# Patient Record
Sex: MALE | Race: WHITE | URBAN - METROPOLITAN AREA
[De-identification: names, ages, dates, MRNs, and addresses within clinical notes are randomized per-mention and may not be internally consistent; named-entity substitution may affect disease eponyms.]

---

## 2017-08-10 ENCOUNTER — OFFICE VISIT (OUTPATIENT)
Dept: FAMILY MEDICINE | Facility: CLINIC | Age: 22
End: 2017-08-10
Payer: COMMERCIAL

## 2017-08-10 VITALS
WEIGHT: 156 LBS | SYSTOLIC BLOOD PRESSURE: 105 MMHG | HEART RATE: 46 BPM | DIASTOLIC BLOOD PRESSURE: 63 MMHG | OXYGEN SATURATION: 98 % | BODY MASS INDEX: 21.84 KG/M2 | HEIGHT: 71 IN | TEMPERATURE: 97.5 F

## 2017-08-10 DIAGNOSIS — G56.03 BILATERAL CARPAL TUNNEL SYNDROME: Primary | ICD-10-CM

## 2017-08-10 PROCEDURE — 99213 OFFICE O/P EST LOW 20 MIN: CPT | Performed by: FAMILY MEDICINE

## 2017-08-10 RX ORDER — CYCLOSPORINE 0.5 MG/ML
1 EMULSION OPHTHALMIC 2 TIMES DAILY
COMMUNITY

## 2017-08-10 NOTE — PROGRESS NOTES
"  HPI:    Xu is a 22 year old male here to discuss:    Bilateral carpal tunnel syndrome - mostly the right. The left is actually not bad. Present since 2016. The symptoms are intermittent pain from the wrist to the 1st to 3rd fingers and also radiates to the forearms. There is tingling at times he has to shake his hands to feel better. He does a lot of typing and mousing at work. Denies any injury.     ROS:    No fevers. No joint swelling. No rashes.     Exam:    /63  Pulse (!) 46  Temp 97.5  F (36.4  C) (Oral)  Ht 5' 11\" (1.803 m)  Wt 156 lb (70.8 kg)  SpO2 98%  BMI 21.76 kg/m2    Gen: Healthy appearing male in no acute distress  MS: both hands and fingers without tenderness or swelling.  Neuro: no weakness on the , wrists and fingers. Tinel and Phalen negative.      Assessment and Plan - Decision Making    1. Bilateral carpal tunnel syndrome  See below.  - ORTHOPEDICS ADULT REFERRAL      Written instructions given as follows:    Patient Instructions   1. Wear the wrist brace as much as you can. Try to keep the wrists in a neutral position.    2. If the problem gets worse, please see orthopedic doctor - [phone].    3. Come in for a physical with fasting labs.          "

## 2017-08-10 NOTE — NURSING NOTE
"Chief Complaint   Patient presents with     Musculoskeletal Problem     Both but R side worst forearm, wrist and hand pain per pt x 1 year on and off no known injury       Initial /63  Pulse (!) 46  Temp 97.5  F (36.4  C) (Oral)  Ht 5' 11\" (1.803 m)  Wt 156 lb (70.8 kg)  SpO2 98%  BMI 21.76 kg/m2 Estimated body mass index is 21.76 kg/(m^2) as calculated from the following:    Height as of this encounter: 5' 11\" (1.803 m).    Weight as of this encounter: 156 lb (70.8 kg).  Medication Reconciliation: complete      Fidelina Kitchen MA    "

## 2017-08-10 NOTE — MR AVS SNAPSHOT
After Visit Summary   8/10/2017    Xu Pittman    MRN: [id number]           Patient Information     Date Of Birth          1995        Visit Information        Provider Department      8/10/2017 10:30 AM Madhu Martines MD [facility]        Today's Diagnoses     Bilateral carpal tunnel syndrome    -  1      Care Instructions    1. Wear the wrist brace as much as you can. Try to keep the wrists in a neutral position.    2. If the problem gets worse, please see orthopedic doctor - [phone].    3. Come in for a physical with fasting labs.          Follow-ups after your visit        Additional Services     ORTHOPEDICS ADULT REFERRAL       Your provider has referred you to: [facility]: [facility] [phone]   [email or web address]    Please be aware that coverage of these services is subject to the terms and limitations of your health insurance plan.  Call member services at your health plan with any benefit or coverage questions.      Please bring the following to your appointment:    >>   Any x-rays, CTs or MRIs which have been performed.  Contact the facility where they were done to arrange for  prior to your scheduled appointment.  Any new CT, MRI or other procedures ordered by your specialist must be performed at a [place] facility or coordinated by your clinic's referral office.    >>   List of current medications   >>   This referral request   >>   Any documents/labs given to you for this referral                  Who to contact     If you have questions or need follow up information about today's clinic visit or your schedule please contact [facility] directly at [phone].  Normal or non-critical lab and imaging results will be communicated to you by MyChart, letter or phone within 4 business days after the clinic has received the results. If you do not hear from us within 7 days, please contact the clinic  "through [employer] or phone. If you have a critical or abnormal lab result, we will notify you by phone as soon as possible.  Submit refill requests through Spreadknowledge or call your pharmacy and they will forward the refill request to us. Please allow 3 business days for your refill to be completed.          Additional Information About Your Visit        NeXeptionhart Information     Spreadknowledge gives you secure access to your electronic health record. If you see a primary care provider, you can also send messages to your care team and make appointments. If you have questions, please call your primary care clinic.  If you do not have a primary care provider, please call [phone] and they will assist you.        Care EveryWhere ID     This is your Care EveryWhere ID. This could be used by other organizations to access your [place] medical records  [id number]        Your Vitals Were     Pulse Temperature Height Pulse Oximetry BMI (Body Mass Index)       46 97.5  F (36.4  C) (Oral) 5' 11\" (1.803 m) 98% 21.76 kg/m2        Blood Pressure from Last 3 Encounters:   08/10/17 105/63   06/04/14 108/68   01/14/14 119/64    Weight from Last 3 Encounters:   08/10/17 156 lb (70.8 kg)   06/04/14 154 lb (69.9 kg)   01/14/14 149 lb (67.6 kg)              We Performed the Following     ORTHOPEDICS ADULT REFERRAL        Primary Care Provider    Md Other Clinic                Equal Access to Services     [facility]: Hadii aad ku wilfredoo Sojames, waaxda luqadaha, qaybta kaalmada kalina, rosalind davis . So [facility] [phone].    ATENCIÓN: Si habla español, tiene a ayala disposición servicios gratuitos de asistencia lingüística. Llame al [phone].    We comply with applicable federal civil rights laws and [place] laws. We do not discriminate on the basis of race, color, national origin, age, disability sex, sexual orientation or gender identity.            Thank you!     Thank you for choosing [facility] " [place]  for your care. Our goal is always to provide you with excellent care. Hearing back from our patients is one way we can continue to improve our services. Please take a few minutes to complete the written survey that you may receive in the mail after your visit with us. Thank you!             Your Updated Medication List - Protect others around you: Learn how to safely use, store and throw away your medicines at www.disposemymeds.org.          This list is accurate as of: 8/10/17 10:55 AM.  Always use your most recent med list.                   Brand Name Dispense Instructions for use Diagnosis    OMEGA-3 FISH OIL PO      Take 2 g by mouth daily        RESTASIS 0.05 % ophthalmic emulsion   Generic drug:  cycloSPORINE      1 drop 2 times daily

## 2017-08-10 NOTE — PATIENT INSTRUCTIONS
1. Wear the wrist brace as much as you can. Try to keep the wrists in a neutral position.    2. If the problem gets worse, please see orthopedic doctor - [phone].    3. Come in for a physical with fasting labs.

## 2018-03-14 NOTE — PROGRESS NOTES
HPI:    Xu is a 22 year old male, whom I have not seen in a while. He is here to discuss:    Anxiety - present for many years. Symptoms are worry about many things including health. He worries about what people are thinking. The main issue lately is that he feels people are talking about his poor school performance. He says he does well with his friends with whom he grew up. He has negative thoughts. These feelings are quite distressing to him. He denies symptoms of depression. No SI or HI.  Evaluation and treatment:    I am referring him to Yeny Markham, behavioral specialist.   I asked him to take Prozac 20 mg qd - side effects discussed.    Previous headaches - his headaches have resolved.  Evaluation and treatment:    CT of head was normal on 8/28/13.     Blepharitis - follows with eye doctor and is on Doxy but stopped it recently to see if the headaches were related.     Pityriasis versicolor - discussed previously.       Bilateral carpal tunnel syndrome - Present since 2016.   Evaluation and treatment:   He uses brace as needed.    He is asked to report worsening symptoms.    Preventive -     Immunization History   Administered Date(s) Administered     HepB 1995, 1993, 05/05/1994     Influenza Vaccine IM 3yrs+ 4 Valent IIV4 11/10/2013     MMR 10/25/1994, 07/26/1999     Mantoux Tuberculin Skin Test 08/14/2012     Meningococcal (Menactra ) 08/14/2012     Meningococcal (Menomune ) 08/06/2008     TD (ADULT, 7+) 04/14/2006     TDAP Vaccine (Adacel) 08/14/2012     Varicella 11/08/1996, 08/06/2008     Non fasting BMP and lipids ordered.    ROS:   Const: No fevers, weight changes or night sweats recently.   ENT: No runny nose, sore throat or ear pain.   Resp: No cough or shortness of breath.   CV: No chest pain, dizziness or cardiac palpitations.   GI: No nausea, vomiting, diarrhea or constipation. Denies blood in stools or black stools.   : No dysuria, frequency or hematuria.     SH:   Non  "smoker. Going to school. Caffeine 2 per day.       Exam:    /76 (Cuff Size: Adult Regular)  Pulse 69  Temp 97.1  F (36.2  C) (Oral)  Resp 16  Ht 5' 11\" (1.803 m)  Wt 157 lb (71.2 kg)  SpO2 98%  BMI 21.9 kg/m2    Gen: Healthy appearing male in no acute distress  Psych: Affect is normal. Speech is fluent. Thought logical. Insight and judgement seem to be intact. Denies SI or HI.      Assessment and Plan - Decision Making    1. Generalized anxiety disorder  Per HPI  - FLUoxetine (PROZAC) 20 MG capsule; Take 1 capsule (20 mg) by mouth daily  Dispense: 30 capsule; Refill: 0    2. Screening for diabetes mellitus  Per HPI  - Basic metabolic panel    3. Screening cholesterol level  Per [facility]  - Lipid panel reflex to direct LDL Non-fasting      Written instructions given as follows:    Patient Instructions   1. Take Prozac 20 mg daily. Possible side effects include sexual dysfunction and suicide ideation. You are to stop the medicine and report these right away if they occur. Remember that it might take 3-6 weeks to start being effective.    2. Make an appointment with Yeny Markham, behavioral specialist, [phone].    3. See you in 3 weeks for follow-up.          "

## 2018-03-16 ENCOUNTER — OFFICE VISIT (OUTPATIENT)
Dept: FAMILY MEDICINE | Facility: CLINIC | Age: 23
End: 2018-03-16
Payer: COMMERCIAL

## 2018-03-16 VITALS
RESPIRATION RATE: 16 BRPM | BODY MASS INDEX: 21.98 KG/M2 | TEMPERATURE: 97.1 F | OXYGEN SATURATION: 98 % | HEART RATE: 69 BPM | DIASTOLIC BLOOD PRESSURE: 76 MMHG | SYSTOLIC BLOOD PRESSURE: 122 MMHG | WEIGHT: 157 LBS | HEIGHT: 71 IN

## 2018-03-16 DIAGNOSIS — Z13.220 SCREENING CHOLESTEROL LEVEL: ICD-10-CM

## 2018-03-16 DIAGNOSIS — F41.1 GENERALIZED ANXIETY DISORDER: Primary | ICD-10-CM

## 2018-03-16 DIAGNOSIS — Z13.1 SCREENING FOR DIABETES MELLITUS: ICD-10-CM

## 2018-03-16 LAB
ANION GAP SERPL CALCULATED.3IONS-SCNC: 8 MMOL/L (ref 3–14)
BUN SERPL-MCNC: 19 MG/DL (ref 7–30)
CALCIUM SERPL-MCNC: 9.1 MG/DL (ref 8.5–10.1)
CHLORIDE SERPL-SCNC: 103 MMOL/L (ref 94–109)
CHOLEST SERPL-MCNC: 149 MG/DL
CO2 SERPL-SCNC: 27 MMOL/L (ref 20–32)
CREAT SERPL-MCNC: 1.06 MG/DL (ref 0.66–1.25)
GFR SERPL CREATININE-BSD FRML MDRD: 85 ML/MIN/1.7M2
GLUCOSE SERPL-MCNC: 92 MG/DL (ref 70–99)
HDLC SERPL-MCNC: 47 MG/DL
LDLC SERPL CALC-MCNC: 85 MG/DL
NONHDLC SERPL-MCNC: 102 MG/DL
POTASSIUM SERPL-SCNC: 4 MMOL/L (ref 3.4–5.3)
SODIUM SERPL-SCNC: 138 MMOL/L (ref 133–144)
TRIGL SERPL-MCNC: 85 MG/DL

## 2018-03-16 PROCEDURE — 80048 BASIC METABOLIC PNL TOTAL CA: CPT | Performed by: FAMILY MEDICINE

## 2018-03-16 PROCEDURE — 80061 LIPID PANEL: CPT | Performed by: FAMILY MEDICINE

## 2018-03-16 PROCEDURE — 99214 OFFICE O/P EST MOD 30 MIN: CPT | Performed by: FAMILY MEDICINE

## 2018-03-16 PROCEDURE — 36415 COLL VENOUS BLD VENIPUNCTURE: CPT | Performed by: FAMILY MEDICINE

## 2018-03-16 RX ORDER — DOXYCYCLINE 100 MG/1
TABLET ORAL
COMMUNITY
Start: 2018-01-05

## 2018-03-16 ASSESSMENT — ANXIETY QUESTIONNAIRES
2. NOT BEING ABLE TO STOP OR CONTROL WORRYING: NEARLY EVERY DAY
GAD7 TOTAL SCORE: 14
IF YOU CHECKED OFF ANY PROBLEMS ON THIS QUESTIONNAIRE, HOW DIFFICULT HAVE THESE PROBLEMS MADE IT FOR YOU TO DO YOUR WORK, TAKE CARE OF THINGS AT HOME, OR GET ALONG WITH OTHER PEOPLE: NOT DIFFICULT AT ALL
3. WORRYING TOO MUCH ABOUT DIFFERENT THINGS: NEARLY EVERY DAY
1. FEELING NERVOUS, ANXIOUS, OR ON EDGE: MORE THAN HALF THE DAYS
6. BECOMING EASILY ANNOYED OR IRRITABLE: MORE THAN HALF THE DAYS
7. FEELING AFRAID AS IF SOMETHING AWFUL MIGHT HAPPEN: SEVERAL DAYS
5. BEING SO RESTLESS THAT IT IS HARD TO SIT STILL: SEVERAL DAYS

## 2018-03-16 ASSESSMENT — PATIENT HEALTH QUESTIONNAIRE - PHQ9: 5. POOR APPETITE OR OVEREATING: MORE THAN HALF THE DAYS

## 2018-03-16 NOTE — NURSING NOTE
"Chief Complaint   Patient presents with     Anxiety       Initial /76 (Cuff Size: Adult Regular)  Pulse 69  Temp 97.1  F (36.2  C) (Oral)  Resp 16  Ht 5' 11\" (1.803 m)  Wt 157 lb (71.2 kg)  SpO2 98%  BMI 21.9 kg/m2 Estimated body mass index is 21.9 kg/(m^2) as calculated from the following:    Height as of this encounter: 5' 11\" (1.803 m).    Weight as of this encounter: 157 lb (71.2 kg).      Karen Devi LPN    "

## 2018-03-16 NOTE — MR AVS SNAPSHOT
After Visit Summary   3/16/2018    Xu Pittman    MRN: [id number]           Patient Information     Date Of Birth          1995        Visit Information        Provider Department      3/16/2018 11:50 AM Madhu Martines MD [facility]        Today's Diagnoses     Generalized anxiety disorder    -  1    Screening for diabetes mellitus        Screening cholesterol level          Care Instructions    1. Take Prozac 20 mg daily. Possible side effects include sexual dysfunction and suicide ideation. You are to stop the medicine and report these right away if they occur. Remember that it might take 3-6 weeks to start being effective.    2. Make an appointment with Yeny Markham, behavioral specialist, [phone].    3. See you in 3 weeks for follow-up.              Follow-ups after your visit        Who to contact     If you have questions or need follow up information about today's clinic visit or your schedule please contact [facility] directly at [phone].  Normal or non-critical lab and imaging results will be communicated to you by Rutanethart, letter or phone within 4 business days after the clinic has received the results. If you do not hear from us within 7 days, please contact the clinic through Yugmat or phone. If you have a critical or abnormal lab result, we will notify you by phone as soon as possible.  Submit refill requests through [employer] or call your pharmacy and they will forward the refill request to us. Please allow 3 business days for your refill to be completed.          Additional Information About Your Visit        Rutanethart Information     [employer] gives you secure access to your electronic health record. If you see a primary care provider, you can also send messages to your care team and make appointments. If you have questions, please call your primary care clinic.  If you do not have a primary care provider, please call [phone] and  "they will assist you.        Care EveryWhere ID     This is your Care EveryWhere ID. This could be used by other organizations to access your [place] medical records  [id number]        Your Vitals Were     Pulse Temperature Respirations Height Pulse Oximetry BMI (Body Mass Index)    69 97.1  F (36.2  C) (Oral) 16 5' 11\" (1.803 m) 98% 21.9 kg/m2       Blood Pressure from Last 3 Encounters:   03/16/18 122/76   08/10/17 105/63   06/04/14 108/68    Weight from Last 3 Encounters:   03/16/18 157 lb (71.2 kg)   08/10/17 156 lb (70.8 kg)   06/04/14 154 lb (69.9 kg)              We Performed the Following     Basic metabolic panel     Lipid panel reflex to direct LDL Fasting          Today's Medication Changes          These changes are accurate as of 3/16/18 12:31 PM.  If you have any questions, ask your nurse or doctor.               Start taking these medicines.        Dose/Directions    FLUoxetine 20 MG capsule   Commonly known as:  PROzac   Used for:  Generalized anxiety disorder   Started by:  Madhu Martines MD        Dose:  20 mg   Take 1 capsule (20 mg) by mouth daily   Quantity:  30 capsule   Refills:  0            Where to get your medicines      These medications were sent to [facility] [address], [facility] [address]  [address] [place], [place] [address]     Phone:  [phone]     FLUoxetine 20 MG capsule                Primary Care Provider Office Phone # Fax #    [facility] [phone] [address] [facility] [address]        Equal Access to Services     [facility]: Hadii aad ku hadasho Soomaali, waaxda luqadaha, qaybta kaalmada adeegyada, rosalind bal. So [facility] [phone].    ATENCIÓN: Si habla español, tiene a ayala disposición servicios gratuitos de asistencia lingüística. Llame al [phone].    We comply with applicable federal civil rights laws and [place] laws. We do not discriminate on " the basis of race, color, national origin, age, disability, sex, sexual orientation, or gender identity.            Thank you!     Thank you for choosing [facility]  [facility] your [facility]. Our goal is always to provide you with excellent care. Hearing back from our patients is one way we can continue to improve our services. Please take a few minutes to complete the written survey that you may receive in the mail after your visit with us. Thank you!             Your Updated Medication List - Protect others around you: Learn how to safely use, store and throw away your medicines at www.disposemymeds.org.          This list is accurate as of 3/16/18 12:31 PM.  Always use your most recent med list.                   Brand Name Dispense Instructions for use Diagnosis    doxycycline Monohydrate 100 MG Tabs           FLUoxetine 20 MG capsule    PROzac    30 capsule    Take 1 capsule (20 mg) by mouth daily    Generalized anxiety disorder       OMEGA-3 FISH OIL PO      Take 2 g by mouth daily        RESTASIS 0.05 % ophthalmic emulsion   Generic drug:  cycloSPORINE      1 drop 2 times daily

## 2018-03-16 NOTE — PATIENT INSTRUCTIONS
1. Take Prozac 20 mg daily. Possible side effects include sexual dysfunction and suicide ideation. You are to stop the medicine and report these right away if they occur. Remember that it might take 3-6 weeks to start being effective.    2. Make an appointment with Yeny Markham, behavioral specialist, [phone].    3. See you in 3 weeks for follow-up.

## 2018-03-17 ASSESSMENT — PATIENT HEALTH QUESTIONNAIRE - PHQ9: SUM OF ALL RESPONSES TO PHQ QUESTIONS 1-9: 18

## 2018-03-17 ASSESSMENT — ANXIETY QUESTIONNAIRES: GAD7 TOTAL SCORE: 14

## 2018-03-21 ENCOUNTER — TELEPHONE (OUTPATIENT)
Dept: FAMILY MEDICINE | Facility: CLINIC | Age: 23
End: 2018-03-21

## 2018-03-21 DIAGNOSIS — F41.1 GENERALIZED ANXIETY DISORDER: Primary | ICD-10-CM

## 2018-03-21 NOTE — TELEPHONE ENCOUNTER
Patient states Fluoxetine makes him feel like he has 5 cup of coffee and felling unconvertible. Please advise. Ok to leave a message.

## 2018-03-21 NOTE — TELEPHONE ENCOUNTER
Pt started prozac 20 mg daily on Friday.  He takes it in the morning.  Pt has been feeling strange on it.  He has hx of panic attacks but has not had one in a long time.  He has now had one yesterday and today about 5-7 pm.  He is feeling like he had lots of coffee, he is pacing and restless.  Discussed with pt that with anxiety medications there are often side effects when you start them that go away after you have been on the medication for several weeks.  Advised we try to classify them as tolerable or not tolerable.  Pt states if they continue like this for much longer or worsen they will not be tolerable.  Advised Dr. Madhu Martines will be back in clinic about 10 am tomorrow and I will review your message with him and you back.  Advised ok to wait on morning dose until I call back.  Confirmed number to call in morning.    Reviewed 1-2-3 breathing and progressive muscle relaxation techniques with pt to use during anxiety or panic attacks.  Pt verbalized understanding.  Karley Justice RN

## 2018-03-22 RX ORDER — CITALOPRAM HYDROBROMIDE 10 MG/1
10 TABLET ORAL DAILY
Qty: 30 TABLET | Refills: 0 | Status: SHIPPED | OUTPATIENT
Start: 2018-03-22 | End: 2018-05-15

## 2018-03-22 NOTE — TELEPHONE ENCOUNTER
Ok.    Sometimes Prozac can do that.    Let's stop that and try Celexa 10 mg at bedtime.    Send update in a few days.    Madhu Martines M.D.

## 2018-03-22 NOTE — TELEPHONE ENCOUNTER
Pt notified of provider message as written.  Pt verbalized good understanding.  Karley Justice RN

## 2018-03-26 NOTE — TELEPHONE ENCOUNTER
Left message on answering machine for patient to call back to [phone] and give update on new med.  Karley Justice RN

## 2018-03-27 NOTE — TELEPHONE ENCOUNTER
Left message on answering machine for patient to call back. [phone]  Donna ALICEAN, RN, CPN

## 2018-05-14 NOTE — PROGRESS NOTES
HPI:    Xu is a 22 year old male, whom I have not seen in a while. He is here to discuss:    Anxiety - present for many years. Symptoms are worry about many things including health. He worries about what people are thinking. The main issue lately is that he feels people are talking about his poor school performance. He says he does well with his friends with whom he grew up. He has negative thoughts. These feelings are quite distressing to him. He denies symptoms of depression. No SI or HI.  Evaluation and treatment:    I had referred him to Yeny Markham, behavioral specialist but he did not set it up - encouraged to do so.   Prozac - stopped due to increased anxiety.   I had rx'd Celexa 20 mg but he did not start it - I encouraged him to do so.   I rx'd Propranolol as needed.    Previous headaches - his headaches have resolved.  Evaluation and treatment:    CT of head was normal on 8/28/13.     Blepharitis - follows with eye doctor and is on Doxy but stopped it recently to see if the headaches were related.     Pityriasis versicolor - discussed previously.     Bilateral carpal tunnel syndrome - Present since 2016.   Evaluation and treatment:   He uses brace as needed.    He is asked to report worsening symptoms.    Preventive - we gave him 1st HPV today.    Immunization History   Administered Date(s) Administered     HPV9 05/15/2018     HepB 1993, 1993, 05/05/1994     Influenza Vaccine IM 3yrs+ 4 Valent IIV4 11/10/2013     MMR 10/25/1994, 07/26/1999     Mantoux Tuberculin Skin Test 08/14/2012     Meningococcal (Menactra ) 08/14/2012     Meningococcal (Menomune ) 08/06/2008     TD (ADULT, 7+) 04/14/2006     TDAP Vaccine (Adacel) 08/14/2012     Varicella 11/08/1996, 08/06/2008     STD screen - ordered today    ROS:   Const: No fevers, weight changes or night sweats recently.   ENT: No runny nose, sore throat or ear pain.   Resp: No cough or shortness of breath.   CV: No chest pain, dizziness  or cardiac palpitations.   GI: No nausea, vomiting, diarrhea or constipation. Denies blood in stools or black stools.   : No dysuria, frequency or hematuria.     SH:    Marital status: dating casually  Kids: no  Employment: going to school for [employer].  Exercise: not much  Tobacco: no  Etoh: 2 per month  Recreational drugs: no  Caffeine: coffee 2 cups per day.    Exam:    /60 (Cuff Size: Adult Regular)  Pulse 58  Temp 98  F (36.7  C) (Oral)  Wt 157 lb (71.2 kg)  SpO2 97%  BMI 21.9 kg/m2    Gen: Healthy appearing male in no acute distress  Psych: Affect is normal. Speech is fluent. Thought logical. Insight and judgement seem to be intact. Denies SI or HI.    Assessment and Plan - Decision Making    1. Generalized anxiety disorder  Per HPI  - citalopram (CELEXA) 10 MG tablet; Take 1 tablet (10 mg) by mouth daily  Dispense: 30 tablet; Refill: 0  - propranolol (INDERAL) 10 MG tablet; Take 1 tablet (10 mg) by mouth 2 times daily  Dispense: 60 tablet; Refill: 0    2. Screen for STD (sexually transmitted disease)  Per HPI  - Chlamydia trachomatis PCR  - HIV Antigen Antibody Combo  - Neisseria gonorrhoeae PCR  - Herpes Simplex Virus 1 and 2 IgG    3. Need for vaccination  Per HPI  - 1st  Administration  [88447]  - HUMAN PAPILLOMA VIRUS (GARDASIL 9) VACCINE [63978]      Written instructions given as follows:    Patient Instructions   1. Start Celexa. Possible side effects include sexual dysfunction and suicide ideation. You are to stop the medicine and report these right away if they occur. Remember that it might take 3-6 weeks to start being effective.    2. Take Propranolol 10 mg twice per day.     3. Set up an appointment with Yeny Markham, behavioral specialist, [phone].     4. I have written you the school letter.    5. I will contact you via My Chart with results - If you need help logging in to My Chart, please call [phone].    6. See you in 3 weeks for  follow-up.      Screening Questionnaire for Adult Immunization    Are you sick today?   No   Do you have allergies to medications, food, a vaccine component or latex?   Yes   Have you ever had a serious reaction after receiving a vaccination?   No   Do you have a long-term health problem with heart disease, lung disease, asthma, kidney disease, metabolic disease (e.g. diabetes), anemia, or other blood disorder?   No   Do you have cancer, leukemia, HIV/AIDS, or any other immune system problem?   No   In the past 3 months, have you taken medications that affect  your immune system, such as prednisone, other steroids, or anticancer drugs; drugs for the treatment of rheumatoid arthritis, Crohn s disease, or psoriasis; or have you had radiation treatments?   No   Have you had a seizure, or a brain or other nervous system problem?   No   During the past year, have you received a transfusion of blood or blood     products, or been given immune (gamma) globulin or antiviral drug?   No   For women: Are you pregnant or is there a chance you could become        pregnant during the next month?   No   Have you received any vaccinations in the past 4 weeks?   No     Immunization questionnaire was positive for at least one answer.  Notified Dr. Martines.      Screening performed by Karen Devi on 5/15/2018 at 4:38 PM.

## 2018-05-15 ENCOUNTER — OFFICE VISIT (OUTPATIENT)
Dept: FAMILY MEDICINE | Facility: CLINIC | Age: 23
End: 2018-05-15
Payer: COMMERCIAL

## 2018-05-15 VITALS
OXYGEN SATURATION: 97 % | WEIGHT: 157 LBS | HEART RATE: 58 BPM | DIASTOLIC BLOOD PRESSURE: 60 MMHG | TEMPERATURE: 98 F | SYSTOLIC BLOOD PRESSURE: 105 MMHG | BODY MASS INDEX: 21.9 KG/M2

## 2018-05-15 DIAGNOSIS — Z23 NEED FOR VACCINATION: ICD-10-CM

## 2018-05-15 DIAGNOSIS — F41.1 GENERALIZED ANXIETY DISORDER: Primary | ICD-10-CM

## 2018-05-15 DIAGNOSIS — Z11.3 SCREEN FOR STD (SEXUALLY TRANSMITTED DISEASE): ICD-10-CM

## 2018-05-15 PROCEDURE — 87389 HIV-1 AG W/HIV-1&-2 AB AG IA: CPT | Performed by: FAMILY MEDICINE

## 2018-05-15 PROCEDURE — 86695 HERPES SIMPLEX TYPE 1 TEST: CPT | Performed by: FAMILY MEDICINE

## 2018-05-15 PROCEDURE — 90651 9VHPV VACCINE 2/3 DOSE IM: CPT | Performed by: FAMILY MEDICINE

## 2018-05-15 PROCEDURE — 86696 HERPES SIMPLEX TYPE 2 TEST: CPT | Performed by: FAMILY MEDICINE

## 2018-05-15 PROCEDURE — 99213 OFFICE O/P EST LOW 20 MIN: CPT | Mod: 25 | Performed by: FAMILY MEDICINE

## 2018-05-15 PROCEDURE — 90471 IMMUNIZATION ADMIN: CPT | Performed by: FAMILY MEDICINE

## 2018-05-15 PROCEDURE — 87491 CHLMYD TRACH DNA AMP PROBE: CPT | Performed by: FAMILY MEDICINE

## 2018-05-15 PROCEDURE — 87591 N.GONORRHOEAE DNA AMP PROB: CPT | Performed by: FAMILY MEDICINE

## 2018-05-15 PROCEDURE — 36415 COLL VENOUS BLD VENIPUNCTURE: CPT | Performed by: FAMILY MEDICINE

## 2018-05-15 RX ORDER — CITALOPRAM HYDROBROMIDE 10 MG/1
10 TABLET ORAL DAILY
Qty: 30 TABLET | Refills: 0 | Status: SHIPPED | OUTPATIENT
Start: 2018-05-15 | End: 2020-06-05

## 2018-05-15 RX ORDER — PROPRANOLOL HYDROCHLORIDE 10 MG/1
10 TABLET ORAL 2 TIMES DAILY
Qty: 60 TABLET | Refills: 0 | Status: SHIPPED | OUTPATIENT
Start: 2018-05-15

## 2018-05-15 ASSESSMENT — ANXIETY QUESTIONNAIRES
2. NOT BEING ABLE TO STOP OR CONTROL WORRYING: NEARLY EVERY DAY
IF YOU CHECKED OFF ANY PROBLEMS ON THIS QUESTIONNAIRE, HOW DIFFICULT HAVE THESE PROBLEMS MADE IT FOR YOU TO DO YOUR WORK, TAKE CARE OF THINGS AT HOME, OR GET ALONG WITH OTHER PEOPLE: VERY DIFFICULT
1. FEELING NERVOUS, ANXIOUS, OR ON EDGE: MORE THAN HALF THE DAYS
5. BEING SO RESTLESS THAT IT IS HARD TO SIT STILL: NOT AT ALL
7. FEELING AFRAID AS IF SOMETHING AWFUL MIGHT HAPPEN: SEVERAL DAYS
3. WORRYING TOO MUCH ABOUT DIFFERENT THINGS: NEARLY EVERY DAY
6. BECOMING EASILY ANNOYED OR IRRITABLE: MORE THAN HALF THE DAYS
GAD7 TOTAL SCORE: 12

## 2018-05-15 ASSESSMENT — PATIENT HEALTH QUESTIONNAIRE - PHQ9: 5. POOR APPETITE OR OVEREATING: SEVERAL DAYS

## 2018-05-15 NOTE — LETTER
[facility]  [address] [facility] [address]  Phone: [phone]    May 15, 2018        Xu Pittman  [address] [facility] [address]        To whom it may concern:    RE: Xu Pittman    I am Mr. Pittman's family doctor. He had to withdraw from school due to anxiety.    Please excuse him for this.    Please contact me for questions or concerns.      Sincerely,        BELKIS GORDON MD

## 2018-05-15 NOTE — PATIENT INSTRUCTIONS
1. Start Celexa. Possible side effects include sexual dysfunction and suicide ideation. You are to stop the medicine and report these right away if they occur. Remember that it might take 3-6 weeks to start being effective.    2. Take Propranolol 10 mg twice per day.     3. Set up an appointment with Yeny Markham, behavioral specialist, [phone].     4. I have written you the school letter.    5. I will contact you via My Chart with results - If you need help logging in to My Chart, please call [phone].    6. See you in 3 weeks for follow-up.

## 2018-05-15 NOTE — MR AVS SNAPSHOT
After Visit Summary   5/15/2018    Xu Pittman    MRN: [id number]           Patient Information     Date Of Birth          1995        Visit Information        Provider Department      5/15/2018 3:30 PM Madhu Martines MD [facility]        Today's Diagnoses     Screen for STD (sexually transmitted disease)    -  1    Generalized anxiety disorder          Care Instructions    1. Start Celexa. Possible side effects include sexual dysfunction and suicide ideation. You are to stop the medicine and report these right away if they occur. Remember that it might take 3-6 weeks to start being effective.    2. Take Propranolol 10 mg twice per day.     3. Set up an appointment with Yeny Markham, behavioral specialist, [phone].     4. I have written you the school letter.    5. I will contact you via My Chart with results - If you need help logging in to My Chart, please call [phone].    6. See you in 3 weeks for follow-up.          Follow-ups after your visit        Who to contact     If you have questions or need follow up information about today's clinic visit or your schedule please contact [facility] directly at [phone].  Normal or non-critical lab and imaging results will be communicated to you by MyChart, letter or phone within 4 business days after the clinic has received the results. If you do not hear from us within 7 days, please contact the clinic through MyChart or phone. If you have a critical or abnormal lab result, we will notify you by phone as soon as possible.  Submit refill requests through [employer] or call your pharmacy and they will forward the refill request to us. Please allow 3 business days for your refill to be completed.          Additional Information About Your Visit        Memonichart Information     [employer] gives you secure access to your electronic health record. If you see a primary care provider, you can also send messages  to your care team and make appointments. If you have questions, please call your primary care clinic.  If you do not have a primary care provider, please call [phone] and they will assist you.        Care EveryWhere ID     This is your Care EveryWhere ID. This could be used by other organizations to access your [facility] medical records  [id number]        Your Vitals Were     Pulse Temperature Pulse Oximetry BMI (Body Mass Index)          58 98  F (36.7  C) (Oral) 97% 21.9 kg/m2         Blood Pressure from Last 3 Encounters:   05/15/18 105/60   03/16/18 122/76   08/10/17 105/63    Weight from Last 3 Encounters:   05/15/18 157 lb (71.2 kg)   03/16/18 157 lb (71.2 kg)   08/10/17 156 lb (70.8 kg)              We Performed the Following     Chlamydia trachomatis PCR     Herpes Simplex Virus 1 and 2 IgG     HIV Antigen Antibody Combo     Neisseria gonorrhoeae PCR          Today's Medication Changes          These changes are accurate as of 5/15/18  4:09 PM.  If you have any questions, ask your nurse or doctor.               Start taking these medicines.        Dose/Directions    propranolol 10 MG tablet   Commonly known as:  INDERAL   Used for:  Generalized anxiety disorder   Started by:  Madhu Martines MD        Dose:  10 mg   Take 1 tablet (10 mg) by mouth 2 times daily   Quantity:  60 tablet   Refills:  0            Where to get your medicines      These medications were sent to [id number] - [place], [place] - [address] [place] [address]    Hours:  test fax successful 4/5/04  Phone:  [phone]     citalopram 10 MG tablet    propranolol 10 MG tablet                Primary Care Provider Office Phone # Fax #    [facility] [phone] [address] [address] [address]        Equal Access to Services     GAYATRI MIRANDA AH: Pineda Sotomayor, waaxda luqadaha, qaybta kaalmashala gilman, rosalind marina  lalizeth bal. So [facility] [phone].    ATENCIÓN: Si habla elio, tiene a ayala disposición servicios gratuitos de asistencia lingüística. Racquel al [phone].    We comply with applicable federal civil rights laws and [place] laws. We do not discriminate on the basis of race, color, national origin, age, disability, sex, sexual orientation, or gender identity.            Thank you!     Thank you for choosing [facility]  for your care. Our goal is always to provide you with excellent care. Hearing back from our patients is one way we can continue to improve our services. Please take a few minutes to complete the written survey that you may receive in the mail after your visit with us. Thank you!             Your Updated Medication List - Protect others around you: Learn how to safely use, store and throw away your medicines at www.disposemymeds.org.          This list is accurate as of 5/15/18  4:09 PM.  Always use your most recent med list.                   Brand Name Dispense Instructions for use Diagnosis    citalopram 10 MG tablet    celeXA    30 tablet    Take 1 tablet (10 mg) by mouth daily    Generalized anxiety disorder       doxycycline Monohydrate 100 MG Tabs           OMEGA-3 FISH OIL PO      Take 2 g by mouth daily        propranolol 10 MG tablet    INDERAL    60 tablet    Take 1 tablet (10 mg) by mouth 2 times daily    Generalized anxiety disorder       RESTASIS 0.05 % ophthalmic emulsion   Generic drug:  cycloSPORINE      1 drop 2 times daily

## 2018-05-15 NOTE — NURSING NOTE
"Chief Complaint   Patient presents with     Anxiety       Initial /60 (Cuff Size: Adult Regular)  Pulse 58  Temp 98  F (36.7  C) (Oral)  Wt 157 lb (71.2 kg)  SpO2 97%  BMI 21.9 kg/m2 Estimated body mass index is 21.9 kg/(m^2) as calculated from the following:    Height as of 3/16/18: 5' 11\" (1.803 m).    Weight as of this encounter: 157 lb (71.2 kg).      Karen Devi LPN    "

## 2018-05-16 LAB
C TRACH DNA SPEC QL NAA+PROBE: NEGATIVE
HIV 1+2 AB+HIV1 P24 AG SERPL QL IA: NONREACTIVE
HSV1 IGG SERPL QL IA: <0.2 AI (ref 0–0.8)
HSV2 IGG SERPL QL IA: <0.2 AI (ref 0–0.8)
N GONORRHOEA DNA SPEC QL NAA+PROBE: NEGATIVE
SPECIMEN SOURCE: NORMAL
SPECIMEN SOURCE: NORMAL

## 2018-05-16 ASSESSMENT — ANXIETY QUESTIONNAIRES: GAD7 TOTAL SCORE: 12

## 2018-05-16 ASSESSMENT — PATIENT HEALTH QUESTIONNAIRE - PHQ9: SUM OF ALL RESPONSES TO PHQ QUESTIONS 1-9: 13

## 2018-05-16 NOTE — PROGRESS NOTES
Ion Mcnair,    Results are normal so far. I will forward the rest when I get it.    Regards,    Madhu Martines M.D.

## 2018-07-18 ENCOUNTER — TRANSFERRED RECORDS (OUTPATIENT)
Dept: HEALTH INFORMATION MANAGEMENT | Facility: CLINIC | Age: 23
End: 2018-07-18

## 2020-02-24 ENCOUNTER — HEALTH MAINTENANCE LETTER (OUTPATIENT)
Age: 25
End: 2020-02-24

## 2020-06-05 ENCOUNTER — VIRTUAL VISIT (OUTPATIENT)
Dept: FAMILY MEDICINE | Facility: CLINIC | Age: 25
End: 2020-06-05
Payer: COMMERCIAL

## 2020-06-05 DIAGNOSIS — F41.1 GENERALIZED ANXIETY DISORDER: ICD-10-CM

## 2020-06-05 PROCEDURE — 99213 OFFICE O/P EST LOW 20 MIN: CPT | Mod: 95 | Performed by: FAMILY MEDICINE

## 2020-06-05 RX ORDER — CITALOPRAM HYDROBROMIDE 10 MG/1
10 TABLET ORAL DAILY
Qty: 90 TABLET | Refills: 1 | Status: SHIPPED | OUTPATIENT
Start: 2020-06-05

## 2020-06-05 ASSESSMENT — PATIENT HEALTH QUESTIONNAIRE - PHQ9
5. POOR APPETITE OR OVEREATING: SEVERAL DAYS
SUM OF ALL RESPONSES TO PHQ QUESTIONS 1-9: 4

## 2020-06-05 ASSESSMENT — ANXIETY QUESTIONNAIRES
5. BEING SO RESTLESS THAT IT IS HARD TO SIT STILL: NOT AT ALL
GAD7 TOTAL SCORE: 9
2. NOT BEING ABLE TO STOP OR CONTROL WORRYING: NEARLY EVERY DAY
3. WORRYING TOO MUCH ABOUT DIFFERENT THINGS: NEARLY EVERY DAY
7. FEELING AFRAID AS IF SOMETHING AWFUL MIGHT HAPPEN: NOT AT ALL
1. FEELING NERVOUS, ANXIOUS, OR ON EDGE: SEVERAL DAYS
IF YOU CHECKED OFF ANY PROBLEMS ON THIS QUESTIONNAIRE, HOW DIFFICULT HAVE THESE PROBLEMS MADE IT FOR YOU TO DO YOUR WORK, TAKE CARE OF THINGS AT HOME, OR GET ALONG WITH OTHER PEOPLE: SOMEWHAT DIFFICULT
6. BECOMING EASILY ANNOYED OR IRRITABLE: SEVERAL DAYS

## 2020-06-05 NOTE — PROGRESS NOTES
"Xu Pittman is a 24 year old male who is being evaluated via a billable telephone visit.      The patient has been notified of following:     \"This telephone visit will be conducted via a call between you and your physician/provider. We have found that certain health care needs can be provided without the need for a physical exam.  This service lets us provide the care you need with a short phone conversation.  If a prescription is necessary we can send it directly to your pharmacy.  If lab work is needed we can place an order for that and you can then stop by our lab to have the test done at a later time.    Telephone visits are billed at different rates depending on your insurance coverage. During this emergency period, for some insurers they may be billed the same as an in-person visit.  Please reach out to your insurance provider with any questions.    If during the course of the call the physician/provider feels a telephone visit is not appropriate, you will not be charged for this service.\"    Patient has given verbal consent for Telephone visit?  Yes    What phone number would you like to be contacted at? [phone]    How would you like to obtain your AVS? Leonela    Subjective     Xu Pittman is a 24 year old male who presents via phone visit today for the following health issues:    I have not seen him in a while.      Please note: only the issues listed at the bottom under Assessment and Plan are addressed today. The rest of the medical problems are listed for the sake of completeness.      Anxiety - present for many years. Symptoms are worry about many things including health. He worries about what people are thinking. The main issue lately is that he feels people are talking about his poor school performance. He says he does well with his friends with whom he grew up. He has negative thoughts. These feelings are quite distressing to him. He denies symptoms of depression. No SI or HI.  Evaluation " and treatment:    I previously referred him to Yeny Markham, behavioral specialist but he did not go.   Prozac - stopped due to increased anxiety.   Celexa 10 mg daily - he just restarted it about 5 weeks ago.   I previously rx'd Propranolol as needed - he feels he does not need it.   Continue same tx.    Previous headaches - his headaches have resolved.  Evaluation and treatment:    CT of head was normal on 8/28/13.     Blepharitis - follows with eye doctor and is on Doxy but stopped it recently to see if the headaches were related.     Pityriasis versicolor - discussed previously.     Bilateral carpal tunnel syndrome - Present since 2016.   Evaluation and treatment:   He uses brace as needed.    He is asked to report worsening symptoms.    Preventive -     Immunization History   Administered Date(s) Administered     HPV9 05/15/2018     HepB 1995, 1995, 05/05/1994     Influenza Vaccine IM > 6 months Valent IIV4 11/10/2013     MMR 10/25/1994, 07/26/1999     Mantoux Tuberculin Skin Test 08/14/2012     Meningococcal (Menactra ) 08/14/2012     Meningococcal (Menomune ) 08/06/2008     TD (ADULT, 7+) 04/14/2006     TDAP Vaccine (Adacel) 08/14/2012     Varicella 11/08/1996, 08/06/2008     STD screen - previously done.    ROS:   Const: No fevers, weight changes or night sweats recently.   ENT: No runny nose, sore throat or ear pain.   Resp: No cough or shortness of breath.   CV: No chest pain, dizziness or cardiac palpitations.   GI: No nausea, vomiting, diarrhea or constipation. Denies blood in stools or black stools.   : No dysuria, frequency or hematuria.     SH:    Marital status: dating casually  Kids: no  Employment: going to school for computer science.  Exercise: not much  Tobacco: no  Etoh: 2 per month  Recreational drugs: no  Caffeine: coffee 2 cups per day.    Exam:    There were no vitals taken for this visit.    Gen: sounded healthy on the phone.    Assessment and Plan - Decision  Making    1. Generalized anxiety disorder    Per HPI    - citalopram (CELEXA) 10 MG tablet; Take 1 tablet (10 mg) by mouth daily  Dispense: 90 tablet; Refill: 1      Written instructions given as follows:    Patient Instructions   See you in 6 months for a physical with fasting labs.    Total time on the phone and reviewing records: 11 min

## 2020-06-06 ASSESSMENT — ANXIETY QUESTIONNAIRES: GAD7 TOTAL SCORE: 9

## 2020-08-11 ENCOUNTER — OFFICE VISIT (OUTPATIENT)
Dept: URGENT CARE | Facility: URGENT CARE | Age: 25
End: 2020-08-11
Payer: COMMERCIAL

## 2020-08-11 ENCOUNTER — ANCILLARY PROCEDURE (OUTPATIENT)
Dept: GENERAL RADIOLOGY | Facility: CLINIC | Age: 25
End: 2020-08-11
Attending: PHYSICIAN ASSISTANT
Payer: COMMERCIAL

## 2020-08-11 VITALS
DIASTOLIC BLOOD PRESSURE: 76 MMHG | TEMPERATURE: 98.9 F | SYSTOLIC BLOOD PRESSURE: 110 MMHG | OXYGEN SATURATION: 98 % | RESPIRATION RATE: 15 BRPM | HEART RATE: 67 BPM

## 2020-08-11 DIAGNOSIS — J98.01 BRONCHOSPASM: ICD-10-CM

## 2020-08-11 DIAGNOSIS — R05.3 PERSISTENT COUGH FOR 3 WEEKS OR LONGER: Primary | ICD-10-CM

## 2020-08-11 PROCEDURE — 99214 OFFICE O/P EST MOD 30 MIN: CPT | Performed by: PHYSICIAN ASSISTANT

## 2020-08-11 PROCEDURE — 71046 X-RAY EXAM CHEST 2 VIEWS: CPT

## 2020-08-11 RX ORDER — ALBUTEROL SULFATE 90 UG/1
2 AEROSOL, METERED RESPIRATORY (INHALATION) EVERY 6 HOURS PRN
Qty: 1 INHALER | Refills: 0 | Status: SHIPPED | OUTPATIENT
Start: 2020-08-11

## 2020-08-11 RX ORDER — PREDNISONE 20 MG/1
40 TABLET ORAL DAILY
Qty: 10 TABLET | Refills: 0 | Status: SHIPPED | OUTPATIENT
Start: 2020-08-11 | End: 2020-08-16

## 2020-08-11 ASSESSMENT — ENCOUNTER SYMPTOMS
HEADACHES: 0
CARDIOVASCULAR NEGATIVE: 1
DYSURIA: 0
NAUSEA: 0
CHEST TIGHTNESS: 0
NEUROLOGICAL NEGATIVE: 1
EYE DISCHARGE: 0
COUGH: 1
CHILLS: 0
EYE REDNESS: 0
EYES NEGATIVE: 1
ENDOCRINE NEGATIVE: 1
ADENOPATHY: 0
HEMATURIA: 0
MUSCULOSKELETAL NEGATIVE: 1
FREQUENCY: 0
WEAKNESS: 0
ABDOMINAL PAIN: 0
CONSTITUTIONAL NEGATIVE: 1
DIARRHEA: 0
RHINORRHEA: 0
SHORTNESS OF BREATH: 0
LIGHT-HEADEDNESS: 0
POLYDIPSIA: 0
DIAPHORESIS: 0
FEVER: 0
GASTROINTESTINAL NEGATIVE: 1
DIZZINESS: 0
SORE THROAT: 0
VOMITING: 0
PALPITATIONS: 0
MYALGIAS: 0
WHEEZING: 0
EYE ITCHING: 0

## 2020-08-11 NOTE — PATIENT INSTRUCTIONS
"Discharge Instructions for COVID-19 Patients  You have--or may have--COVID-19. Please follow the instructions listed below.   If you have a weakened immune system, discuss with your doctor any other actions you need to take.  How can I protect others?  If you have symptoms (fever, cough, body aches or trouble breathing):    Stay home and away from others (self-isolate) until:  ? At least 10 days have passed since your symptoms started. And   ? You've had no fever--and no medicine that reduces fever--for 3 full days (72 hours). And   ? Your other symptoms have resolved (gotten better).  If you don't show symptoms, but testing showed that you have COVID-19:    Stay home and away from others (self-isolate) until at least 10 days have passed since the date of your first positive COVID-19 test.  During this time    Stay in your own room, even for meals. Use your own bathroom if you can.    Stay away from others in your home. No hugging, kissing or shaking hands. No visitors.    Don't go to work, school or anywhere else.    Clean \"high touch\" surfaces often (doorknobs, counters, handles). Use household cleaning spray or wipes. You'll find a full list of  on the EPA website: www.epa.gov/pesticide-registration/list-n-disinfectants-use-against-sars-cov-2.    Cover your mouth and nose with a mask, tissue or wash cloth to avoid spreading germs.    Wash your hands and face often. Use soap and water.    Caregivers in these groups are at risk for severe illness due to COVID-19:  ? People 65 years and older  ? People who live in a nursing home or long-term care facility  ? People with chronic disease (lung, heart, cancer, diabetes, kidney, liver, immunologic)  ? People who have a weakened immune system, including those who:    Are in cancer treatment    Take medicine that weakens the immune system, such as corticosteroids    Had a bone marrow or organ transplant    Have an immune deficiency    Have poorly controlled HIV or " AIDS    Are obese (body mass index of 40 or higher)    Smoke regularly    Caregivers should wear gloves while washing dishes, handling laundry and cleaning bedrooms and bathrooms.    Use caution when washing and drying laundry: Don't shake dirty laundry and use the warmest water setting that you can.    For more tips on managing your health at home, go to www.cdc.gov/coronavirus/2019-ncov/downloads/10Things.pdf.  How can I take care of myself at home?  1. Get lots of rest. Drink extra fluids (unless a doctor has told you not to).  2. Take Tylenol (acetaminophen) for fever or pain. If you have liver or kidney problems, ask your family doctor if it's okay to take Tylenol.     Adults can take either:  ? 650 mg (two 325 mg pills) every 4 to 6 hours, or   ? 1,000 mg (two 500 mg pills) every 8 hours as needed.  ? Note: Don't take more than 3,000 mg in one day. Acetaminophen is found in many medicines (both prescribed and over-the-counter medicines). Read all labels to be sure you don't take too much.   For children, check the Tylenol bottle for the right dose. The dose is based on the child's age or weight.  3. If you have other health problems (like cancer, heart failure, an organ transplant or severe kidney disease): Call your specialty clinic if you don't feel better in the next 2 days.  4. Know when to call 911. Emergency warning signs include:  ? Trouble breathing or shortness of breath  ? Pain or pressure in the chest that doesn't go away  ? Feeling confused like you haven't felt before, or not being able to wake up  ? Bluish-colored lips or face  5. Your doctor may have prescribed a blood thinner medicine. Follow their instructions.  Where can I get more information?     [employer] [facility] - About COVID-19:   [email or web address]    CDC - What to Do If You're Sick: www.cdc.gov/coronavirus/2019-ncov/about/steps-when-sick.html    CDC - Ending Home Isolation:  www.cdc.gov/coronavirus/2019-ncov/hcp/disposition-in-home-patients.html    CDC - Caring for Someone: www.cdc.gov/coronavirus/2019-ncov/if-you-are-sick/care-for-someone.html    [facility] - Interim Guidance for Hospital Discharge to Home: www.health.[facility].[place]/diseases/coronavirus/hcp/hospdischarge.pdf    [facility] clinical trials (COVID-19 research studies): clinicalaffairs.[facility]-clinical-trials    Below are the COVID-19 hotlines at the [place] Department of Health ([facility]). Interpreters are available.  ? For health questions: Call 270-630-5206 or 1-535.880.8790 (7 a.m. to 7 p.m.)  ? For questions about schools and childcare: Call 356-867-2586 or 1-257.419.1094 (7 a.m. to 7 p.m.)    For informational purposes only. Not to replace the advice of your health care provider. Clinically reviewed by the Infection Prevention Team.Copyright   2020 [facility]. All rights reserved. [employer] [id number] - 06/20.      Patient Education     Bronchospasm (Adult)    Bronchospasm occurs when the airways (bronchial tubes) go into spasm and contract. This makes it hard to breathe and causes wheezing (a high-pitched whistling sound). Bronchospasm can also cause frequent coughing without wheezing.  Bronchospasm is due to irritation, inflammation, or allergic reaction of the airways. People with asthma get bronchospasm. However, not everyone with bronchospasm has asthma.  Being exposed to harmful fumes, a recent case of bronchitis, exercise, or a flare-up of chronic obstructive pulmonary disease (COPD) may cause the airways to spasm. An episode of bronchospasm may last 7 to 14 days. Medicine may be prescribed to relax the airways and prevent wheezing. Antibiotics will be prescribed only if your healthcare provider thinks there is a bacterial infection. Antibiotics do not help a viral infection.  Home care    Drink lots of water or other fluids (at least 10 glasses a day) during an attack. This will loosen lung  secretions and make it easier to breathe. If you have heart or kidney disease, check with your doctor before you drink extra fluids.    Take prescribed medicine exactly at the times advised. If you take an inhaled medicine to help with breathing, don't use it more than once every 4 hours, unless told to do so. If prescribed an antibiotic or prednisone, take all of the medicine, even if you are feeling better after a few days.    Don't smoke. Also avoid being exposed to secondhand smoke.    If you were given an inhaler, use it exactly as directed. If you need to use it more often than prescribed, your condition may be getting worse. Contact your healthcare provider.  Follow-up care  Follow up with your healthcare provider, or as advised.  If you are age 65 or older, have a chronic lung disease or condition that affects your immune system, or you smoke, ask your healthcare provider about getting a pneumococcal vaccine, as well as a yearly flu shot (influenza vaccine).  When to seek medical advice  Call your healthcare provider right away if any of these occur:    You need to use your inhalers more often than usual    Fever of 100.4 F (38 C) or higher, or as directed by your healthcare provider    Cough that brings up lots of dark-colored sputum (mucus)    You don't get better within 24 hours  Call 911  Call 911 if any of these occur:    Coughing up bloody sputum (mucus)    Chest pain with each breath    Increased wheezing or shortness of breath  Date Last Reviewed: 6/1/2018 2000-2019 [employer]. [address] [place] [address], [place] [address]. All rights reserved. This information is not intended as a substitute for professional medical care. Always follow your healthcare professional's instructions.

## 2020-08-11 NOTE — PROGRESS NOTES
Chief Complaint:     Chief Complaint   Patient presents with     Cough     x 7 months (intermittent)       HPI: Xu Pittman is an 25 year old male who presents with cough nonproductive, occasional. Symptoms began 7  month ago and has unchanged. The cough comes and goes but is daily.  He typically goes 30-60 minutes in between coughs.  He has tried OTC medications for acid reflux with no relief.   There is no shortness of breath, wheezing and chest pain. Patient has never smoked.       Patient denies any recent travel or exposure to know COVID positive tested individual.  Patient is not a healthcare worker or .      ROS:     Review of Systems   Constitutional: Negative.  Negative for chills, diaphoresis and fever.   HENT: Negative.  Negative for congestion, ear pain, rhinorrhea and sore throat.    Eyes: Negative.  Negative for discharge, redness and itching.   Respiratory: Positive for cough. Negative for chest tightness, shortness of breath and wheezing.    Cardiovascular: Negative.  Negative for chest pain and palpitations.   Gastrointestinal: Negative.  Negative for abdominal pain, diarrhea, nausea and vomiting.   Endocrine: Negative.  Negative for polydipsia and polyuria.   Genitourinary: Negative for dysuria, frequency, hematuria and urgency.   Musculoskeletal: Negative.  Negative for myalgias.   Skin: Negative for rash.   Allergic/Immunologic: Negative for immunocompromised state.   Neurological: Negative.  Negative for dizziness, weakness, light-headedness and headaches.   Hematological: Negative for adenopathy.        Respiratory History  occasional episodes of bronchitis       Family History   Family History   Problem Relation Age of Onset     Hypertension Mother      Arthritis Mother      Alcohol/Drug Maternal Grandfather      Heart Disease Paternal Grandfather         heart attack        Problem history  Patient Active Problem List   Diagnosis     CARDIOVASCULAR SCREENING; LDL GOAL LESS  THAN 130     Generalized anxiety disorder     Pityriasis versicolor     Headache     Bilateral carpal tunnel syndrome        Allergies  Allergies   Allergen Reactions     Augmentin Rash        Social History  Social History     Socioeconomic History     Marital status: Single     Spouse name: Not on file     Number of children: Not on file     Years of education: Not on file     Highest education level: Not on file   Occupational History     Not on file   Social Needs     Financial resource strain: Not on file     Food insecurity     Worry: Not on file     Inability: Not on file     Transportation needs     Medical: Not on file     Non-medical: Not on file   Tobacco Use     Smoking status: Never Smoker     Smokeless tobacco: Never Used   Substance and Sexual Activity     Alcohol use: Yes     Comment: rarely     Drug use: Yes     Comment: linden     Sexual activity: Never   Lifestyle     Physical activity     Days per week: Not on file     Minutes per session: Not on file     Stress: Not on file   Relationships     Social connections     Talks on phone: Not on file     Gets together: Not on file     Attends Buddhist service: Not on file     Active member of club or organization: Not on file     Attends meetings of clubs or organizations: Not on file     Relationship status: Not on file     Intimate partner violence     Fear of current or ex partner: Not on file     Emotionally abused: Not on file     Physically abused: Not on file     Forced sexual activity: Not on file   Other Topics Concern     Parent/sibling w/ CABG, MI or angioplasty before 65F 55M? Not Asked   Social History Narrative     Not on file        Current Meds    Current Outpatient Medications:      albuterol (PROAIR HFA/PROVENTIL HFA/VENTOLIN HFA) 108 (90 Base) MCG/ACT inhaler, Inhale 2 puffs into the lungs every 6 hours as needed for shortness of breath / dyspnea or wheezing, Disp: 1 Inhaler, Rfl: 0     citalopram (CELEXA) 10 MG tablet, Take 1  tablet (10 mg) by mouth daily, Disp: 90 tablet, Rfl: 1     cycloSPORINE (RESTASIS) 0.05 % ophthalmic emulsion, 1 drop 2 times daily, Disp: , Rfl:      Omega-3 Fatty Acids (OMEGA-3 FISH OIL PO), Take 2 g by mouth daily, Disp: , Rfl:      predniSONE (DELTASONE) 20 MG tablet, Take 2 tablets (40 mg) by mouth daily for 5 days, Disp: 10 tablet, Rfl: 0     propranolol (INDERAL) 10 MG tablet, Take 1 tablet (10 mg) by mouth 2 times daily, Disp: 60 tablet, Rfl: 0     doxycycline Monohydrate 100 MG TABS, , Disp: , Rfl:         OBJECTIVE     Vital signs reviewed by Serge Mera PA-C  /76   Pulse 67   Temp 98.9  F (37.2  C)   Resp 15   SpO2 98%      Physical Exam  Vitals signs and nursing note reviewed.   Constitutional:       General: He is not in acute distress.     Appearance: He is well-developed. He is not ill-appearing, toxic-appearing or diaphoretic.   HENT:      Head: Normocephalic and atraumatic.      Right Ear: Hearing, tympanic membrane, ear canal and external ear normal. Tympanic membrane is not perforated, erythematous, retracted or bulging.      Left Ear: Hearing, tympanic membrane, ear canal and external ear normal. Tympanic membrane is not perforated, erythematous, retracted or bulging.      Nose: Nose normal. No mucosal edema or rhinorrhea.      Mouth/Throat:      Pharynx: No oropharyngeal exudate or posterior oropharyngeal erythema.      Tonsils: No tonsillar exudate or tonsillar abscesses. 0 on the right. 0 on the left.   Eyes:      Pupils: Pupils are equal, round, and reactive to light.   Neck:      Musculoskeletal: Normal range of motion and neck supple.   Cardiovascular:      Rate and Rhythm: Normal rate and regular rhythm.      Heart sounds: Normal heart sounds, S1 normal and S2 normal. Heart sounds not distant. No murmur. No friction rub. No gallop.    Pulmonary:      Effort: Pulmonary effort is normal. No respiratory distress.      Breath sounds: Normal breath sounds. No decreased breath  sounds, wheezing, rhonchi or rales.   Abdominal:      General: Bowel sounds are normal. There is no distension.      Palpations: Abdomen is soft.      Tenderness: There is no abdominal tenderness.   Lymphadenopathy:      Cervical: No cervical adenopathy.   Skin:     General: Skin is warm and dry.      Findings: No rash.   Neurological:      Mental Status: He is alert.      Cranial Nerves: No cranial nerve deficit.   Psychiatric:         Attention and Perception: He is attentive.         Speech: Speech normal.         Behavior: Behavior normal. Behavior is cooperative.         Thought Content: Thought content normal.         Judgment: Judgment normal.           Labs:     No results found for any visits on 08/11/20.    Medical Decision Making:    Differential Diagnosis:  URI Adult/Peds:  Bronchitis-viral, Bronchospasm, Viral upper respiratory illness and pertussis.        ASSESSMENT    1. Persistent cough for 3 weeks or longer    2. Bronchospasm        PLAN    Patient presents with 7 month(s) cough nonproductive, occasional.    Patient is in no acute distress.    Temp is 98.9 in clinic today, lung sounds were clear, and O2 sats at 98% on RA.  Imaging to rule out pneumonia was negative for any acute infiltrates or consolidations per my read.  Unclear what is causing his cough.  Low suspicion for medication cause.  Rx for Albuterol inhaler and Prednisone tonight.  Rest, Push fluids, vaporizer, elevation of head of bed.  Ibuprofen and or Tylenol for any fever or body aches.  Over the counter cough suppressant- PRN- as discussed.   If symptoms worsen, recheck immediately otherwise follow up with your PCP in 1 week if symptoms are not improving.  Worrisome symptoms discussed with instructions to go to the ED.  Patient given COVID isolation instructions.  Patient verbalized understanding and agreed with this plan.    Droplet precautions were observed during this visit.  PPE was worn by me during the visit.  PPE included  gown, double gloves, surgical mask, and face shield.  Vital signs were collected by me as well as any NP, or OP swabs if needed.      Serge Mera PA-C  8/11/2020, 6:10 PM

## 2020-08-19 ENCOUNTER — OFFICE VISIT (OUTPATIENT)
Dept: FAMILY MEDICINE | Facility: CLINIC | Age: 25
End: 2020-08-19
Payer: COMMERCIAL

## 2020-08-19 VITALS
TEMPERATURE: 97 F | SYSTOLIC BLOOD PRESSURE: 106 MMHG | DIASTOLIC BLOOD PRESSURE: 70 MMHG | OXYGEN SATURATION: 96 % | WEIGHT: 152 LBS | BODY MASS INDEX: 21.28 KG/M2 | HEART RATE: 72 BPM | HEIGHT: 71 IN

## 2020-08-19 DIAGNOSIS — R05.3 CHRONIC COUGH: Primary | ICD-10-CM

## 2020-08-19 PROCEDURE — 99213 OFFICE O/P EST LOW 20 MIN: CPT | Performed by: FAMILY MEDICINE

## 2020-08-19 ASSESSMENT — MIFFLIN-ST. JEOR: SCORE: 1682.63

## 2020-08-19 NOTE — PATIENT INSTRUCTIONS
1. Avoid caffeine alcohol, spicy foods.    2. Allow 3 hours between eating and going to bed.    3. Take Omeprazole 20 mg twice per day.    4. Let's meet by video or telephone in 2-3 weeks.

## 2020-08-19 NOTE — PROGRESS NOTES
HPI:    Xu Pittman is a 25 year old male who presents via phone visit today for the following health issues:    Please note: only the issues listed at the bottom under Assessment and Plan are addressed today. The rest of the medical problems are listed for the sake of completeness.    Chronic cough - has been present since Jan 2020. It is not productive and seems to be worse in the morning and evening. He denies runny nose, sore throat, ear pain, shortness of breath or fevers. No weigh loss. No known contact, especially to covid 19. No previous dx of asthma. Non smoker. He has some heartburn symptoms.  Evaluation and treatment:    He was seen in urgent care 8/11/20 - CXR negative. Alb and Prednisone not much help.   I don't suspect covid 19 or another infectious cause.   We discussed the diff dx including asthma, GERD or other.   For now we will start with Omeprazole 20 mg bid for one month.   Life style changes discussed.   I asked for a video follow-up in 2-3 weeks.    Anxiety - present for many years. Symptoms are worry about many things including health. He worries about what people are thinking. The main issue lately is that he feels people are talking about his poor school performance. He says he does well with his friends with whom he grew up. He has negative thoughts. These feelings are quite distressing to him. He denies symptoms of depression. No SI or HI.  Evaluation and treatment:    I previously referred him to Yeny Markham, behavioral specialist but he did not go.   Prozac - stopped due to increased anxiety.   Celexa 10 mg daily - he just restarted it about 5 weeks ago.   I previously rx'd Propranolol as needed - he feels he does not need it.   Continue same tx.    Previous headaches - his headaches have resolved.  Evaluation and treatment:    CT of head was normal on 8/28/13.     Blepharitis - follows with eye doctor and is on Doxy but stopped it recently to see if the headaches were  "related.     Pityriasis versicolor - discussed previously.     Bilateral carpal tunnel syndrome - Present since 2016.   Evaluation and treatment:   He uses brace as needed.    He is asked to report worsening symptoms.    Preventive -     Immunization History   Administered Date(s) Administered     HPV9 05/15/2018     HepB 1993, 1993, 05/05/1994     Influenza Vaccine IM > 6 months Valent IIV4 11/10/2013     MMR 10/25/1994, 07/26/1999     Mantoux Tuberculin Skin Test 08/14/2012     Meningococcal (Menactra ) 08/14/2012     Meningococcal (Menomune ) 08/06/2008     TD (ADULT, 7+) 04/14/2006     TDAP Vaccine (Adacel) 08/14/2012     Varicella 11/08/1996, 08/06/2008     STD screen - previously done.    ROS:   Const: No fevers, weight changes or night sweats recently.   ENT: No runny nose, sore throat or ear pain.   Resp: No cough or shortness of breath.   CV: No chest pain, dizziness or cardiac palpitations.   GI: No nausea, vomiting, diarrhea or constipation. Denies blood in stools or black stools.   : No dysuria, frequency or hematuria.     SH:    Marital status: dating casually  Kids: no  Employment: going to school for computer science.  Exercise: not much  Tobacco: no  Etoh: 2 per month  Recreational drugs: no  Caffeine: coffee 2 cups per day.    Exam:    /70   Pulse 72   Temp 97  F (36.1  C) (Tympanic)   Ht 1.797 m (5' 10.75\")   Wt 68.9 kg (152 lb)   SpO2 96%   BMI 21.35 kg/m      Gen: Healthy appearing male in no acute distress  ENT: Oropharynx normal. Oral mucosa moist without lesions.  Eyes: Conjunctiva and sclera normal. Pupils react normally to light. No nystagmus.  Neck: No enlarged lymph nodes, thyromegally or other masses.  Lungs: Good air movement and otherwise clear.  CV: Heart RRR with no murmurs. No JVD, carotid bruits or leg edema.  Abd: Soft, non tender, non distended, with normal bowel sounds. No liver or spleen enlargement. No masses. No hernias.    Assessment and Plan - " Decision Making    1. Chronic cough    Per HPI    - omeprazole (PRILOSEC) 20 MG DR capsule; Take 1 capsule (20 mg) by mouth daily  Dispense: 60 capsule; Refill: 2      Written instructions given as follows:    Patient Instructions   1. Avoid caffeine alcohol, spicy foods.    2. Allow 3 hours between eating and going to bed.    3. Take Omeprazole 20 mg twice per day.    4. Let's meet by video or telephone in 2-3 weeks.

## 2020-08-20 ENCOUNTER — VIRTUAL VISIT (OUTPATIENT)
Dept: BEHAVIORAL HEALTH | Facility: CLINIC | Age: 25
End: 2020-08-20
Payer: COMMERCIAL

## 2020-08-20 DIAGNOSIS — R69 DIAGNOSIS DEFERRED: Primary | ICD-10-CM

## 2020-08-20 NOTE — PROGRESS NOTES
[facility]  No data recorded      Social Work Care Navigator Note      Patient: Xu Pittman  Date: August 20, 2020  Preferred Name: Xu    Previous PHQ-9:   PHQ-9 SCORE 3/16/2018 5/15/2018 6/5/2020   PHQ-9 Total Score 18 13 4     Previous KATHY-7:   KATHY-7 SCORE 3/16/2018 5/15/2018 6/5/2020   Total Score - - -   Total Score 14 12 9     JHON LEVEL:  JHON Score (Last Two) 12/16/2011   JHON Raw Score 43   Activation Score 68.5   JHON Level 4       Preferred Contact:  No data recorded    Type of Contact Today: Phone call (patient / identified key support person reached)      Data: (subjective / Objective):    [facility] Introduction:  Hi my name is DAVID Samuels from your (name) primary care clinic.     I work closely with your primary care provider, Clinic, [facility].     If it's ok I'd like to talk about some new services available to you, at no out of pocket cost to you.      Before we get started can you verify your insurance for me?     What social work or case management services do you receive? (If so, are you receiving ACT or TCM?).  NA    Getting to Know You - Whole Person Care:  This new service is called Behavioral Health Home services, which is designed to support you as a whole person beyond just your medical needs.        I'm here to be a central point of contact for your healthcare needs and to help with:    Housing    Transportation    Financial resources    Comprehensive Health needs (appointment help, medication costs, etc.)    Employment    Education    Health Insurance applications    And connecting with social supports or community resources    Out of the things I mentioned what would you find helpful?  Patient is not sure if he is needing support at this time. He would like to think about the program and call back later if he decides to enroll. [facility] will mail brochure and letter with contact information.     To get started:   If patient has a Diagnostic Assessment -  "  You can stop in and meet with me in the clinic or we can schedule an appointment right now.      When you come into the clinic there will be a few forms for you to fill out in the lobby.    We'll work together on a brief assessment to better understand how we can help and then put together a plan to meet your needs.    If patient does not have a Diagnostic Assessment -   We'll schedule you for an appointment with (Name of [facility]) to do an assessment and then I'll meet with you briefly afterwards to help you get enrolled.    When you come into the clinic there will be a few forms for you to fill out in the lobby.    Patient response to [facility] Service offering:   Considering enrolling in [facility] services    DAVID Samuels, Social Work Care Coordinator   ________________________________________________________________  Administrative - Social Work Staff Info:     Update the [facility] Brief Needs Assessment Flowsheet \"enrollment status\"     \"declined\"    \"considering\" enrolling in [facility] services.      \"Interested and will enroll\"    \"waitlisted\"    Update enrollment status to \"enrolled\" only when they have come into clinic and completed the paper consent and brief needs assessment.    Verify that patient has had Diagnostic Assessment within the past 12 months and if not schedule an appointment with the [facility] to complete.            "

## 2020-08-20 NOTE — LETTER
August 20, 2020    [facility]  Behavioral Health Home  [address] [place] [address]      Dear Xu Pittman:    I am the Behavioral Health Home Social Work Care Coordinator that works closely with your Primary Care Provider (PCP), to support your healthy living goals.  This letter serves to inform you that you are eligible for Behavioral Health Home Services that is a paid service through your insurance and is free of cost to you. [facility] [place] [facility] is a program created to help integrate your primary care clinic and provide services in addition to caring for your physical health. The following are examples of topics that we can assist with if you are to enroll in Behavioral Health Home ([facility]):  - Housing  - Transportation  - Financial Resources  - Coordination with the [facility] for Benefits (MA, SNAP benefits, etc)  - Disability Eligibility and Benefits  - Medical Appointments and Medication Costs  - Employment and Education  - Disability Related Information and Education  - Referrals to mental health services, chemical dependency assessment/treatment, etc     I have enclosed the [facility] Handout that offers basic information of what our program entails. If you are interested in learning more about and/or enrolling in Behavioral Health Home services, you may ask your PCP to connect you with me or feel free to contact me to schedule an appointment. My contact information is listed below.    I look forward to hearing from you!          Korina Gagnon [facility]  [facility] [place] [facility])   [phone]  [email or web address]

## 2020-09-11 ENCOUNTER — VIRTUAL VISIT (OUTPATIENT)
Dept: FAMILY MEDICINE | Facility: CLINIC | Age: 25
End: 2020-09-11
Payer: COMMERCIAL

## 2020-09-11 DIAGNOSIS — F41.1 GENERALIZED ANXIETY DISORDER: ICD-10-CM

## 2020-09-11 PROCEDURE — 99214 OFFICE O/P EST MOD 30 MIN: CPT | Mod: 95 | Performed by: FAMILY MEDICINE

## 2020-09-11 RX ORDER — CITALOPRAM HYDROBROMIDE 20 MG/1
20 TABLET ORAL DAILY
Qty: 90 TABLET | Refills: 3 | Status: SHIPPED | OUTPATIENT
Start: 2020-09-11 | End: 2021-12-29

## 2020-09-11 ASSESSMENT — ANXIETY QUESTIONNAIRES
6. BECOMING EASILY ANNOYED OR IRRITABLE: SEVERAL DAYS
7. FEELING AFRAID AS IF SOMETHING AWFUL MIGHT HAPPEN: NOT AT ALL
GAD7 TOTAL SCORE: 8
3. WORRYING TOO MUCH ABOUT DIFFERENT THINGS: NEARLY EVERY DAY
5. BEING SO RESTLESS THAT IT IS HARD TO SIT STILL: SEVERAL DAYS
2. NOT BEING ABLE TO STOP OR CONTROL WORRYING: SEVERAL DAYS
1. FEELING NERVOUS, ANXIOUS, OR ON EDGE: SEVERAL DAYS

## 2020-09-11 ASSESSMENT — PATIENT HEALTH QUESTIONNAIRE - PHQ9: 5. POOR APPETITE OR OVEREATING: SEVERAL DAYS

## 2020-09-11 NOTE — PROGRESS NOTES
"Xu Pittman is a 25 year old male who is being evaluated via a billable telephone visit.      The patient has been notified of following:     \"This telephone visit will be conducted via a call between you and your physician/provider. We have found that certain health care needs can be provided without the need for a physical exam.  This service lets us provide the care you need with a short phone conversation.  If a prescription is necessary we can send it directly to your pharmacy.  If lab work is needed we can place an order for that and you can then stop by our lab to have the test done at a later time.    Telephone visits are billed at different rates depending on your insurance coverage. During this emergency period, for some insurers they may be billed the same as an in-person visit.  Please reach out to your insurance provider with any questions.    If during the course of the call the physician/provider feels a telephone visit is not appropriate, you will not be charged for this service.\"    Patient has given verbal consent for Telephone visit?  Yes    What phone number would you like to be contacted at? [phone]    How would you like to obtain your AVS?     Subjective     Xu Pittman is a 25 year old male who presents via phone visit today for the following health issues:    Please note: only the issues listed at the bottom under Assessment and Plan are addressed today. The rest of the medical problems are listed for the sake of completeness.    Chronic cough - has been present since Jan 2020. It is not productive and seems to be worse in the morning and evening. He denies runny nose, sore throat, ear pain, shortness of breath or fevers. No weigh loss. No known contact, especially to covid 19. No previous dx of asthma. Non smoker. He has some heartburn symptoms.  Evaluation and treatment:    He was seen in urgent care 8/11/20 - CXR negative. Alb and Prednisone not much help.   I don't suspect " covid 19 or another infectious cause.   Previously I advised Omeprazole 20 mg bid for one month - this has resolved his cough.   Life style changes discussed.    Anxiety - present for many years. Symptoms are worry about many things including health. He worries about what people are thinking. The main issue lately is that he feels people are talking about his poor school performance. He says he does well with his friends with whom he grew up. He has negative thoughts. These feelings are quite distressing to him. He denies symptoms of depression. No SI or HI.  Evaluation and treatment:    I previously referred him to Yeny Markham, behavioral specialist but he did not go.   Prozac - stopped due to increased anxiety.   Celexa 10 mg daily - no side effects. He requests higher dose - I approved 20 mg daily.   I previously rx'd Propranolol as needed - he feels he does not need it.   He is advised to report if his symptoms are not controlled.    Previous headaches - his headaches have resolved.  Evaluation and treatment:    CT of head was normal on 8/28/13.     Blepharitis - follows with eye doctor and is on Doxy but stopped it recently to see if the headaches were related.     Pityriasis versicolor - discussed previously.     Bilateral carpal tunnel syndrome - Present since 2016.   Evaluation and treatment:   He uses brace as needed.    He is asked to report worsening symptoms.    Preventive -     Immunization History   Administered Date(s) Administered     HPV9 05/15/2018     HepB 1993, 1993, 05/05/1994     Influenza Vaccine IM > 6 months Valent IIV4 11/10/2013     MMR 10/25/1994, 07/26/1999     Mantoux Tuberculin Skin Test 08/14/2012     Meningococcal (Menactra ) 08/14/2012     Meningococcal (Menomune ) 08/06/2008     TD (ADULT, 7+) 04/14/2006     TDAP Vaccine (Adacel) 08/14/2012     Varicella 11/08/1996, 08/06/2008     STD screen - previously done.    ROS:   Const: No fevers, weight changes or  night sweats recently.   ENT: No runny nose, sore throat or ear pain.   Resp: No cough or shortness of breath.   CV: No chest pain, dizziness or cardiac palpitations.   GI: No nausea, vomiting, diarrhea or constipation. Denies blood in stools or black stools.   : No dysuria, frequency or hematuria.     SH:    Marital status: dating casually  Kids: no  Employment: going to school for computer science.  Exercise: not much  Tobacco: no  Etoh: 2 per month  Recreational drugs: no  Caffeine: coffee 2 cups per day.    Exam:    There were no vitals taken for this visit.    Gen: sounded healthy on the phone.    Assessment and Plan - Decision Making    1. Generalized anxiety disorder    Per HPI    - citalopram (CELEXA) 20 MG tablet; Take 1 tablet (20 mg) by mouth daily  Dispense: 90 tablet; Refill: 3      Written instructions given as follows:    Patient Instructions   Let's increase the Celexa to 20 mg daily. If this does not work, set up another telephone visit.    See you in 3 months for a physical with fasting labs.      Total time on the phone and reviewing records: 6 min

## 2020-09-11 NOTE — PATIENT INSTRUCTIONS
Let's increase the Celexa to 20 mg daily. If this does not work, set up another telephone visit.    See you in 3 months for a physical with fasting labs.

## 2020-09-11 NOTE — PROGRESS NOTES
"Xu Pittman is a 25 year old male who is being evaluated via a billable video visit.      The patient has been notified of following:     \"This video visit will be conducted via a call between you and your physician/provider. We have found that certain health care needs can be provided without the need for an in-person physical exam.  This service lets us provide the care you need with a video conversation.  If a prescription is necessary we can send it directly to your pharmacy.  If lab work is needed we can place an order for that and you can then stop by our lab to have the test done at a later time.    Video visits are billed at different rates depending on your insurance coverage.  Please reach out to your insurance provider with any questions.    If during the course of the call the physician/provider feels a video visit is not appropriate, you will not be charged for this service.\"    Patient has given verbal consent for Video visit? {YES-NO  Default Yes:4444::\"Yes\"}  How would you like to obtain your AVS? {AVS Preference:244725}  If you are dropped from the video visit, the video invite should be resent to: {video visit invitation:576270}  Will anyone else be joining your video visit? {:221195}  {If patient encounters technical issues they should call [phone] :135220}    Subjective     Xu Pittman is a 25 year old male who presents today via video visit for the following health issues:    HPI    {SUPERLIST (Optional):381050}  {PEDS Chronic and Acute Problems (Optional):104339}     Video Start Time: {video visit start/end time for provider to select:142504}    {additonal problems for provider to add (Optional):899082}    Review of Systems   {ROS COMP (Optional):822135}      Objective           Vitals:  No vitals were obtained today due to virtual visit.    Physical Exam     {video visit exam brief selected:038174::\"GENERAL: Healthy, alert and no distress\",\"EYES: Eyes grossly normal to inspection.  " "No discharge or erythema, or obvious scleral/conjunctival abnormalities.\",\"RESP: No audible wheeze, cough, or visible cyanosis.  No visible retractions or increased work of breathing.  \",\"SKIN: Visible skin clear. No significant rash, abnormal pigmentation or lesions.\",\"NEURO: Cranial nerves grossly intact.  Mentation and speech appropriate for age.\",\"PSYCH: Mentation appears normal, affect normal/bright, judgement and insight intact, normal speech and appearance well-groomed.\"}      {Diagnostic Test Results (Optional):169743}        {PROVIDER CHARTING PREFERENCE:666315}      Video-Visit Details    Type of service:  Video Visit    Video End Time:{video visit start/end time for provider to select:152948}    Originating Location (pt. Location): {video visit patient location:201173::\"Home\"}    Distant Location (provider location):  [facility]     Platform used for Video Visit: {Virtual Visit Platforms:770596::\"[employer]\"}        "

## 2020-09-12 ASSESSMENT — ANXIETY QUESTIONNAIRES: GAD7 TOTAL SCORE: 8

## 2020-12-13 ENCOUNTER — HEALTH MAINTENANCE LETTER (OUTPATIENT)
Age: 25
End: 2020-12-13

## 2021-04-17 ENCOUNTER — HEALTH MAINTENANCE LETTER (OUTPATIENT)
Age: 26
End: 2021-04-17

## 2021-09-26 ENCOUNTER — HEALTH MAINTENANCE LETTER (OUTPATIENT)
Age: 26
End: 2021-09-26

## 2022-03-17 DIAGNOSIS — F41.1 GENERALIZED ANXIETY DISORDER: ICD-10-CM

## 2022-03-17 NOTE — TELEPHONE ENCOUNTER
"Requested Prescriptions   Pending Prescriptions Disp Refills    citalopram (CELEXA) 20 MG tablet [Pharmacy Med Name: Citalopram Hydrobromide Oral Tablet 20 MG] 30 tablet 0     Sig: TAKE 1 TABLET BY MOUTH ONCE DAILY        SSRIs Protocol Failed - 3/17/2022  1:11 PM        Failed - Recent (12 mo) or future (30 days) visit within the authorizing provider's specialty     Patient has had an office visit with the authorizing provider or a provider within the authorizing providers department within the previous 12 mos or has a future within next 30 days. See \"Patient Info\" tab in inbasket, or \"Choose Columns\" in Meds & Orders section of the refill encounter.              Passed - Medication is active on med list        Passed - Patient is age 18 or older                "

## 2022-03-18 RX ORDER — CITALOPRAM HYDROBROMIDE 20 MG/1
20 TABLET ORAL DAILY
Qty: 30 TABLET | Refills: 0 | Status: SHIPPED | OUTPATIENT
Start: 2022-03-18

## 2022-05-08 ENCOUNTER — HEALTH MAINTENANCE LETTER (OUTPATIENT)
Age: 27
End: 2022-05-08

## 2022-06-02 DIAGNOSIS — F41.1 GENERALIZED ANXIETY DISORDER: ICD-10-CM

## 2022-06-03 RX ORDER — CITALOPRAM HYDROBROMIDE 20 MG/1
TABLET ORAL
Qty: 30 TABLET | Refills: 0 | OUTPATIENT
Start: 2022-06-03

## 2022-06-03 NOTE — TELEPHONE ENCOUNTER
Routing refill request to provider for review/approval because:  Nataly given x1 and patient did not follow up, please advise  Karley Justice BSN, RN

## 2023-04-23 ENCOUNTER — HEALTH MAINTENANCE LETTER (OUTPATIENT)
Age: 28
End: 2023-04-23

## 2023-06-02 ENCOUNTER — HEALTH MAINTENANCE LETTER (OUTPATIENT)
Age: 28
End: 2023-06-02